# Patient Record
Sex: FEMALE | Race: BLACK OR AFRICAN AMERICAN | Employment: UNEMPLOYED
[De-identification: names, ages, dates, MRNs, and addresses within clinical notes are randomized per-mention and may not be internally consistent; named-entity substitution may affect disease eponyms.]

---
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
## 2023-01-19 DIAGNOSIS — L70.0 ACNE VULGARIS: Primary | ICD-10-CM

## 2023-01-19 DIAGNOSIS — Z79.899 ON ISOTRETINOIN THERAPY: ICD-10-CM

## 2023-01-19 RX ORDER — ISOTRETINOIN 30 MG/1
30 CAPSULE ORAL 2 TIMES DAILY
Qty: 60 CAPSULE | Refills: 0 | Status: SHIPPED | OUTPATIENT
Start: 2023-01-19 | End: 2023-02-18

## 2023-01-20 ENCOUNTER — TELEPHONE (OUTPATIENT)
Dept: DERMATOLOGY | Age: 18
End: 2023-01-20

## 2023-02-14 ENCOUNTER — OFFICE VISIT (OUTPATIENT)
Dept: DERMATOLOGY | Age: 18
End: 2023-02-14
Payer: COMMERCIAL

## 2023-02-14 VITALS
HEART RATE: 76 BPM | HEIGHT: 70 IN | DIASTOLIC BLOOD PRESSURE: 78 MMHG | BODY MASS INDEX: 18.87 KG/M2 | SYSTOLIC BLOOD PRESSURE: 114 MMHG | WEIGHT: 131.8 LBS | OXYGEN SATURATION: 97 % | TEMPERATURE: 97.5 F

## 2023-02-14 DIAGNOSIS — L70.0 ACNE VULGARIS: Primary | ICD-10-CM

## 2023-02-14 DIAGNOSIS — Z79.899 ON ISOTRETINOIN THERAPY: ICD-10-CM

## 2023-02-14 PROCEDURE — 99213 OFFICE O/P EST LOW 20 MIN: CPT | Performed by: PHYSICIAN ASSISTANT

## 2023-02-14 NOTE — PROGRESS NOTES
Dermatology Patient Note  [address] [facility]  [address] [facility] [address] [address]  Dept: [phone]  Dept Fax: [phone]      VISITDATE: 2/14/2023   REFERRING PROVIDER: No ref. provider found      Yamila Pond is a 21 y.o. female  who presents today in the office for:    Follow-up and Acne (Pt  states her acne is leaving scarring and she would like to get accutane started. She was kicked out of ipledge in December )      HISTORY OF PRESENT ILLNESS:  As above. Pt missed p/u window. She is due for 2nd pregnancy test on 2/20/2023. All questions regarding the iPledge/prescription p/u process were answered. All SE questions were answered as well. MEDICAL PROBLEMS:  Patient Active Problem List    Diagnosis Date Noted    Atopic dermatitis 12/15/2011     11/08      Rhinitis 12/15/2011     11/08         CURRENT MEDICATIONS:   Current Outpatient Medications   Medication Sig Dispense Refill    ISOtretinoin (ACCUTANE) 30 MG chemo capsule Take 1 capsule by mouth 2 times daily 60 capsule 0    triamcinolone (KENALOG) 0.025 % cream Apply to rash on face twice daily 80 g 2    triamcinolone (KENALOG) 0.1 % cream Apply to rash twice daily (not face, armpit or groin) 80 g 1    AVIANE 0.1-20 MG-MCG per tablet TAKE 1 TABLET BY MOUTH ONCE DAILY      NONFORMULARY Indications: LEAVE ACTIVE Ceravae healing ointment for dry cracking skin on face. OTC      NONFORMULARY Apply topically as needed Salycylic acid body wash OTC body acne LEAVE ACTIVE      Multiple Vitamin CHEW Take  by mouth.         ARTHRITIS PAIN RELIEF 650 MG extended release tablet TAKE 1 TABLET BY MOUTH EVERY 6 HOURS AS NEEDED FOR PAIN, NOT TO EXCEED 5 DAYS (Patient not taking: No sig reported)      benzoyl peroxide 5 % gel Apply topically once or twice daily as directed (Patient not taking: No sig reported) 90 Bottle 3     No current facility-administered medications for this visit. ALLERGIES:   No Known Allergies    SOCIAL HISTORY:  Social History     Tobacco Use    Smoking status: Never     Passive exposure: Yes    Smokeless tobacco: Never    Tobacco comments:     smoking done inside home   Substance Use Topics    Alcohol use: Never       Pertinent ROS:  Review of Systems  Skin: Denies any new changing, growing or bleeding lesions or rashes except as described in the HPI   Constitutional: Denies fevers, chills, and malaise. PHYSICAL EXAM:   /78   Pulse 76   Temp 97.5 °F (36.4 °C) (Temporal)   Ht 5' 10\" (1.778 m)   Wt 131 lb 12.8 oz (59.8 kg)   LMP 02/01/2023   SpO2 97%   BMI 18.91 kg/m²     The patient is generally well appearing, well nourished, alert and conversational. Affect is normal.    Cutaneous Exam:  Physical Exam  Face and neck only was examined. Facial covering was removed during examination. Diagnoses/exam findings/medical history pertinent to this visit are listed below:    Assessment:   Diagnosis Orders   1. Acne vulgaris             Plan:  1.  Acne vulgaris  Isotretinoin Continuation  - Isotretinoin start date: 8/18/22  - Months of completed treatment: 2  - Current isotretinoin dose: 60 mg/day  - Anticipated isotretinoin dose for upcoming month: 60 mg/kg  - Cumulative isotretinoin dose: 2700 mg  - Goal isotretinoin dosage: 9000 mg  (60 kg * 150 mg/kg)  - 2 forms of contraception include: 1.) OCP and 2.) Condoms.  - Refill, pending labs  - Patient counseled to immediately notify provider of any concerning side effects  - Patient counseled to not share the medication with anyone nor donate blood while on isotretinoin       - Check baseline AST, ALT, and Triglycerides, then after 1 month of therapy, then repeat only if increasing dose or if abnormal  - Urine preg due on 2/20/2023      RTC 1M    Future Appointments   Date Time Provider Andrews Shepherd   3/14/2023 12:30 PM Dasha Ramey PA-C  derm [facility]         There are no Patient Instructions on file for this visit.       Electronically signed by Michelle Scott PA-C on 2/14/23 at 2:47 PM EST

## 2023-02-23 ENCOUNTER — TELEPHONE (OUTPATIENT)
Dept: DERMATOLOGY | Age: 18
End: 2023-02-23

## 2023-02-27 DIAGNOSIS — L70.0 ACNE VULGARIS: Primary | ICD-10-CM

## 2023-02-27 DIAGNOSIS — Z79.899 ON ISOTRETINOIN THERAPY: ICD-10-CM

## 2023-02-27 RX ORDER — ISOTRETINOIN 30 MG/1
30 CAPSULE ORAL 2 TIMES DAILY
Qty: 60 CAPSULE | Refills: 0 | Status: SHIPPED | OUTPATIENT
Start: 2023-02-27 | End: 2023-03-29

## 2023-03-06 DIAGNOSIS — L70.0 ACNE VULGARIS: Primary | ICD-10-CM

## 2023-03-06 RX ORDER — ISOTRETINOIN 30 MG/1
30 CAPSULE ORAL 2 TIMES DAILY
Qty: 60 CAPSULE | Refills: 0 | Status: SHIPPED | OUTPATIENT
Start: 2023-03-06 | End: 2023-04-05

## 2023-03-14 ENCOUNTER — TELEMEDICINE (OUTPATIENT)
Dept: DERMATOLOGY | Age: 18
End: 2023-03-14
Payer: COMMERCIAL

## 2023-03-14 DIAGNOSIS — L70.0 ACNE VULGARIS: Primary | ICD-10-CM

## 2023-03-14 DIAGNOSIS — Z79.899 ON ISOTRETINOIN THERAPY: ICD-10-CM

## 2023-03-14 PROCEDURE — 99213 OFFICE O/P EST LOW 20 MIN: CPT | Performed by: PHYSICIAN ASSISTANT

## 2023-03-14 NOTE — PROGRESS NOTES
TELEHEALTH EVALUATION -- Audio/Visual (During AKTZM-94 public health emergency)    Dermatology Patient Note  [address] [facility] [address]  [address] [facility] [address] [address]  Dept: [phone]  Dept Fax: [phone]      VISITDATE: 3/14/2023   REFERRING PROVIDER: No ref. provider found      Svitlana Goldstein is a 21 y.o. female  who presents today in the office for:    No chief complaint on file. PERTINENT HISTORY NOT LISTED ABOVE:  Pt again missed window for filling isotretinoin. There was a mandatory change in pharmacy, which caused a delay and made her late in getting her prescription. CURRENT MEDICATIONS:   Current Outpatient Medications   Medication Sig Dispense Refill    ISOtretinoin (ACCUTANE) 30 MG chemo capsule Take 1 capsule by mouth 2 times daily 60 capsule 0    triamcinolone (KENALOG) 0.025 % cream Apply to rash on face twice daily 80 g 2    triamcinolone (KENALOG) 0.1 % cream Apply to rash twice daily (not face, armpit or groin) 80 g 1    ARTHRITIS PAIN RELIEF 650 MG extended release tablet TAKE 1 TABLET BY MOUTH EVERY 6 HOURS AS NEEDED FOR PAIN, NOT TO EXCEED 5 DAYS (Patient not taking: No sig reported)      AVIANE 0.1-20 MG-MCG per tablet TAKE 1 TABLET BY MOUTH ONCE DAILY      NONFORMULARY Indications: LEAVE ACTIVE Ceravae healing ointment for dry cracking skin on face. OTC      NONFORMULARY Apply topically as needed Salycylic acid body wash OTC body acne LEAVE ACTIVE      benzoyl peroxide 5 % gel Apply topically once or twice daily as directed (Patient not taking: No sig reported) 90 Bottle 3    Multiple Vitamin CHEW Take  by mouth. No current facility-administered medications for this visit.        ALLERGIES:   No Known Allergies    SOCIAL HISTORY:  Social History     Tobacco Use    Smoking status: Never     Passive exposure: Yes    Smokeless tobacco: Never    Tobacco comments:     smoking done inside home Substance Use Topics    Alcohol use: Never       Pertinent ROS:  Review of Systems  Skin: Denies any new changing, growing or bleeding lesions or rashes except as described in the HPI   Constitutional: Denies fevers, chills, and malaise. PHYSICAL EXAM:     Due to this being a TeleHealth encounter, evaluation of the following organ systems is limited: Vitals/Constitutional/EENT/Resp/CV/GI//MS/Neuro/Skin/Heme-Lymph-Imm. In particular examination of the skin is limited by video quality and patient available technology. There were no vitals taken for this visit. The patient is generally well appearing, well nourished, alert and conversational. Affect is normal.    Cutaneous Exam:  Physical Exam  Face and neck only were examined    Diagnoses/exam findings/medical history pertinent to this visit are listed below:    Assessment and Plan:  Assessment   1. Acne vulgaris  - chronic illness with progression and/or exacerbation   - Pt is now due for pregnancy test on 3/24/23. I will fill at 30 mg qd, pending labs, and consider this a restart, as pt has not had her medication in 3 months. iPledge enrollment in anticipation of starting isotretinoin in 1 month  - Ipledge program consent obtained. The patient signed and initialed all requisite fields as a testament to her understanding of the program requirements. iPLEDGE program consent forms scanned into chart.  - Goal isotretinoin dosage: 9000 mg  (60 kg) * 150 mg/kg)  - Anticipated initial isotretinoin dose: 30 mg  - 2 forms of contraception include: 1.) OCP and 2.) condoms.  - Patient counseled to not share the medication with anyone nor donate blood while on isotretinoin       - Check baseline AST, ALT, and Triglycerides, then after 1 month of therapy, then repeat only if increasing dose or if abnormal  - Check urine pregnancy test today and at monthly follow-up.   - Side effects of isotretinoin discussed, including teratogenicity, dryness, nosebleeds, muscle aches and pains, headaches, pseudotumor cerebri (which may manifest as severe headaches and vision changes), liver dysfunction, hypertriglyceridemia (which could lead to acute pancreatitis), mood changes, depression, psychosis, and suicide. They understand that they cannot donate blood while on isotretinoin and not to share the drug with anyone. Patient is to avoid tetracyclines and vitamin A supplements while on isotretinoin. Patient will not have any cosmetic procedures while on isotretinoin due to increased risk of scarring. The patient should discontinue isotretinoin and report to the ER for vision change or headaches not relieved by OTC pain relievers. Pt is to inform all other medical providers that pt is on isotretinoin. Patient counseled to immediately notify provider of any concerning side effects       2. On isotretinoin therapy  - Will fill at 30 mg qd, pending urine preg (due 3/24/23)     RTC 6W    There are no Patient Instructions on file for this visit. This note was created with the assistance of a speech-recognition program.  Although the intention is to generate a document that actually reflects the content of the visit, no guarantees can be provided that every mistake has been identified and corrected byediting. Pursuant to the emergency declaration under the [facility] waiver authority and the [employer] and [employer] Act, this Virtual  Visit was conducted, with patient's consent, to reduce the patient's risk of exposure to COVID-19 and provide continuity of care for an established patient. Services were provided through a video synchronous discussion virtually to substitute for in-person clinic visit.      Electronically signed by Eric Valiente PA-C on 3/14/23 at 12:40 PM EDT

## 2025-05-12 ENCOUNTER — OFFICE VISIT (OUTPATIENT)
Age: 20
End: 2025-05-12

## 2025-05-12 VITALS
OXYGEN SATURATION: 98 % | WEIGHT: 131 LBS | DIASTOLIC BLOOD PRESSURE: 79 MMHG | TEMPERATURE: 97.4 F | BODY MASS INDEX: 18.8 KG/M2 | HEART RATE: 96 BPM | SYSTOLIC BLOOD PRESSURE: 117 MMHG

## 2025-05-12 DIAGNOSIS — H00.14 CHALAZION LEFT UPPER EYELID: Primary | ICD-10-CM

## 2025-05-12 ASSESSMENT — ENCOUNTER SYMPTOMS
NAUSEA: 0
BACK PAIN: 0
COLOR CHANGE: 0
FOREIGN BODY SENSATION: 0
COUGH: 0
DOUBLE VISION: 0
EYE PAIN: 0
BLURRED VISION: 0
EYE DISCHARGE: 0
PHOTOPHOBIA: 0
RHINORRHEA: 0
SINUS PRESSURE: 0
CHEST TIGHTNESS: 0
EYE REDNESS: 0
SHORTNESS OF BREATH: 0
EYE ITCHING: 0
VOMITING: 0
SORE THROAT: 0
ABDOMINAL PAIN: 0

## 2025-05-12 NOTE — PROGRESS NOTES
[facility], [facility] ([facility])  [facility] [address] [address]  Dept: [phone]     Date: 25     Wandy Valencia (:  2005) is a 20 y.o. female, here for evaluation of the following chief complaint(s):  Eye Problem (Left eye swelling )       HPI  Patient with swelling of left upper eyelid for \"a couple of weeks\". No visible formation of stye noted by patient. No drainage. Patient has history of Stye formation.       History provided by:  Patient   used: No    Eye Problem   The left eye is affected. This is a new problem. The current episode started 1 to 4 weeks ago. The problem occurs constantly. The problem has been gradually improving. There was no injury mechanism. The patient is experiencing no pain. There is No known exposure to pink eye. She Wears contacts. Pertinent negatives include no blurred vision, eye discharge, double vision, eye redness, fever, foreign body sensation, itching, nausea, photophobia, recent URI or vomiting. She has tried nothing for the symptoms.           No past medical history on file.        ROS  Review of Systems   Constitutional:  Negative for appetite change, fatigue and fever.   HENT:  Negative for congestion, ear pain, postnasal drip, rhinorrhea, sinus pressure and sore throat.    Eyes:  Negative for blurred vision, double vision, photophobia, pain, discharge, redness, itching and visual disturbance.   Respiratory:  Negative for cough, chest tightness and shortness of breath.    Cardiovascular:  Negative for chest pain and palpitations.   Gastrointestinal:  Negative for abdominal pain, nausea and vomiting.   Musculoskeletal:  Negative for back pain and myalgias.   Skin:  Negative for color change.   Neurological:  Negative for dizziness, syncope and headaches.   Psychiatric/Behavioral:  Negative for agitation and confusion.    All other systems reviewed and are negative.       PHYSICAL